# Patient Record
Sex: MALE | Race: BLACK OR AFRICAN AMERICAN | URBAN - METROPOLITAN AREA
[De-identification: names, ages, dates, MRNs, and addresses within clinical notes are randomized per-mention and may not be internally consistent; named-entity substitution may affect disease eponyms.]

---

## 2017-08-20 ENCOUNTER — HOSPITAL ENCOUNTER (EMERGENCY)
Facility: CLINIC | Age: 32
Discharge: HOME | End: 2017-08-20
Attending: EMERGENCY MEDICINE | Admitting: EMERGENCY MEDICINE

## 2017-08-20 ENCOUNTER — APPOINTMENT (OUTPATIENT)
Dept: GENERAL RADIOLOGY | Facility: CLINIC | Age: 32
End: 2017-08-20
Attending: EMERGENCY MEDICINE

## 2017-08-20 VITALS
OXYGEN SATURATION: 100 % | TEMPERATURE: 98.3 F | HEIGHT: 70 IN | RESPIRATION RATE: 16 BRPM | SYSTOLIC BLOOD PRESSURE: 147 MMHG | WEIGHT: 224.2 LBS | DIASTOLIC BLOOD PRESSURE: 85 MMHG | HEART RATE: 70 BPM | BODY MASS INDEX: 32.1 KG/M2

## 2017-08-20 DIAGNOSIS — S62.311A: ICD-10-CM
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
## 2017-08-20 DIAGNOSIS — S62.315A DISPLACED FRACTURE OF BASE OF FOURTH METACARPAL BONE, LEFT HAND, INITIAL ENCOUNTER FOR CLOSED FRACTURE: ICD-10-CM

## 2017-08-20 DIAGNOSIS — S62.313A DISPLACED FRACTURE OF BASE OF THIRD METACARPAL BONE, LEFT HAND, INITIAL ENCOUNTER FOR CLOSED FRACTURE: ICD-10-CM

## 2017-08-20 DIAGNOSIS — S62.92XA LEFT HAND FRACTURE, CLOSED, INITIAL ENCOUNTER: ICD-10-CM

## 2017-08-20 PROCEDURE — 29125 APPL SHORT ARM SPLINT STATIC: CPT | Performed by: EMERGENCY MEDICINE

## 2017-08-20 PROCEDURE — 29125 APPL SHORT ARM SPLINT STATIC: CPT | Mod: Z6 | Performed by: EMERGENCY MEDICINE

## 2017-08-20 PROCEDURE — 73110 X-RAY EXAM OF WRIST: CPT | Mod: LT

## 2017-08-20 PROCEDURE — 99284 EMERGENCY DEPT VISIT MOD MDM: CPT | Performed by: EMERGENCY MEDICINE

## 2017-08-20 PROCEDURE — 73130 X-RAY EXAM OF HAND: CPT | Mod: LT

## 2017-08-20 PROCEDURE — 99284 EMERGENCY DEPT VISIT MOD MDM: CPT | Mod: 25 | Performed by: EMERGENCY MEDICINE

## 2017-08-20 PROCEDURE — 25000132 ZZH RX MED GY IP 250 OP 250 PS 637: Performed by: EMERGENCY MEDICINE

## 2017-08-20 RX ORDER — HYDROCODONE BITARTRATE AND ACETAMINOPHEN 5; 325 MG/1; MG/1
1-2 TABLET ORAL EVERY 6 HOURS PRN
Qty: 30 TABLET | Refills: 0 | Status: SHIPPED | OUTPATIENT
Start: 2017-08-20

## 2017-08-20 RX ORDER — HYDROCODONE BITARTRATE AND ACETAMINOPHEN 5; 325 MG/1; MG/1
2 TABLET ORAL ONCE
Status: COMPLETED | OUTPATIENT
Start: 2017-08-20 | End: 2017-08-20

## 2017-08-20 RX ADMIN — HYDROCODONE BITARTRATE AND ACETAMINOPHEN 2 TABLET: 5; 325 TABLET ORAL at 10:32

## 2017-08-20 ASSESSMENT — ENCOUNTER SYMPTOMS
DIFFICULTY URINATING: 0
FEVER: 0
HEADACHES: 0
COUGH: 0
DYSURIA: 0
CONFUSION: 0
VOMITING: 0
ABDOMINAL PAIN: 0
COLOR CHANGE: 0
SHORTNESS OF BREATH: 0
EYE REDNESS: 0

## 2017-08-20 NOTE — ED AVS SNAPSHOT
[facility], Emergency Department    [address] [facility] [address]    Phone:  [phone]                                       Yanet Castañeda   MRN: [id number]    Department:  [facility], Emergency Department   Date of Visit:  8/20/2017           Patient Information     Date Of Birth          1985        Your diagnoses for this visit were:     Left hand fracture, closed, initial encounter        You were seen by Latisha Bonilla MD.        Discharge Instructions         Closed Hand Fracture (Adult)  You have a fracture, or broken bone, in your hand. This may be a small crack or chip in the bone. Or it may be a major break with the broken parts pushed out of place. A closed fracture means that the broken bone has not gone through the skin. A hand fracture is treated with a splint or cast. It usually takes 4 to 6 weeks to heal. Severe injuries may require surgery.    Home care    Keep your arm elevated to reduce pain and swelling. When sitting or lying down, elevate your arm above the level of your heart. You can do this by placing your arm on a pillow that rests on your chest or on a pillow at your side. This is most important during the first 48 hours after injury.    Apply an ice pack over the injured area for no more than 15 to 20 minutes. Do this every 1 to 2 hours for the first 24 to 48 hours. Continue with ice packs as needed to ease pain and swelling. To make an ice pack, put ice cubes in a plastic bag that seals at the top. Wrap the bag in a clean, thin towel or cloth. Never put ice or an ice pack directly on the skin. You can place the ice pack inside the sling and directly over the cast or splint. As the ice melts, be careful that the cast or splint doesn t get wet.    Keep the cast or splint completely dry at all times. Bathe with your cast or splint out of the water, protected with 2 large plastic bags. Place 1 bag outside the other. Tape each bag with duct tape at the top end.  If a fiberglass cast or splint gets wet, dry it with a hair dryer on a cool setting.    You may use over-the-counter pain medicine to control pain, unless another pain medicine was prescribed. If you have chronic liver or kidney disease or ever had a stomach ulcer or GI bleeding, talk with your provider beforeusing these medicines.  Follow-up care  Follow up with your healthcare provider within 1 week, or as advised. This is to be sure the bone is healing properly. If you were given a splint, it may be changed to a cast at your follow-up visit.  If X-rays were taken, you will be told of any new findings that may affect your care.  When to seek medical advice  Call your healthcare provider right away if any of these occur:    The plaster cast or splint becomes wet or soft    The fiberglass cast or splint stays wet for more than 24 hours    The cast has a bad smell    The plaster cast or splint becomes loose    There is increased tightness or pain under the cast or splint    The fingers on your injured hand become swollen, cold, blue, numb, or tingly  Date Last Reviewed: 12/3/2015    2790-1613 [employer]. [address] [place] [address]. All rights reserved. This information is not intended as a substitute for professional medical care. Always follow your healthcare professional's instructions.      Please make an appointment to follow up with Orthopedics (phone: [phone]) for tomorrow morning.  The hand surgeon is Dr. Risa Lindo. The Ortho clinic plans to call you for a time, however if you don't hear from them, call tomorrow morning by 8:30.     24 Hour Appointment Hotline       To make an appointment at any [facility], call [phone] ([phone]). If you don't have a family doctor or clinic, we will help you find one. [facility] are conveniently located to serve the needs of you and your family.          ED Discharge Orders     ROMERO Yuen  "of your medicines      START taking        Dose / Directions Last dose taken    HYDROcodone-acetaminophen 5-325 MG per tablet   Commonly known as:  NORCO   Dose:  1-2 tablet   Quantity:  30 tablet        Take 1-2 tablets by mouth every 6 hours as needed for pain   Refills:  0                Prescriptions were sent or printed at these locations (1 Prescription)                   Other Prescriptions                Printed at Department/Unit printer (1 of 1)         HYDROcodone-acetaminophen (NORCO) 5-325 MG per tablet                Procedures and tests performed during your visit     Wrist XR, G/E 3 views, left    XR Hand Left G/E 3 Views      Orders Needing Specimen Collection     None      Pending Results     No orders found from 8/18/2017 to 8/21/2017.            Pending Culture Results     No orders found from 8/18/2017 to 8/21/2017.            Pending Results Instructions     If you had any lab results that were not finalized at the time of your Discharge, you can call the ED Lab Result RN at [phone]. You will be contacted by this team for any positive Lab results or changes in treatment. The nurses are available 7 days a week from 10A to 6:30P.  You can leave a message 24 hours per day and they will return your call.        Thank you for choosing [facility]       Thank you for choosing [facility] for your care. Our goal is always to provide you with excellent care. Hearing back from our patients is one way we can continue to improve our services. Please take a few minutes to complete the written survey that you may receive in the mail after you visit with us. Thank you!        [employer] Information     [employer] lets you send messages to your doctor, view your test results, renew your prescriptions, schedule appointments and more. To sign up, go to [email or web address] . Click on \"Log in\" on the left side of the screen, which will take you to the Welcome page. Then click on \"Sign up Now\" on the right side of " the page.     You will be asked to enter the access code listed below, as well as some personal information. Please follow the directions to create your username and password.     Your access code is: [email or web address]  Expires: 2017 11:26 AM     Your access code will  in 90 days. If you need help or a new code, please call your [facility] clinic or [phone].        Care EveryWhere ID     This is your Care EveryWhere ID. This could be used by other organizations to access your [facility] medical records  [id number]        Equal Access to Services     [facility]: Haderen Foster, malia matamoros, kalyn chapman, grace velasco . So [facility] [phone].    ATENCIÓN: Si habla español, tiene a neal disposición servicios gratuitos de asistencia lingüística. Llame al [phone].    We comply with applicable federal civil rights laws and [place] laws. We do not discriminate on the basis of race, color, national origin, age, disability sex, sexual orientation or gender identity.            After Visit Summary       This is your record. Keep this with you and show to your community pharmacist(s) and doctor(s) at your next visit.

## 2017-08-20 NOTE — DISCHARGE INSTRUCTIONS
Closed Hand Fracture (Adult)  You have a fracture, or broken bone, in your hand. This may be a small crack or chip in the bone. Or it may be a major break with the broken parts pushed out of place. A closed fracture means that the broken bone has not gone through the skin. A hand fracture is treated with a splint or cast. It usually takes 4 to 6 weeks to heal. Severe injuries may require surgery.    Home care    Keep your arm elevated to reduce pain and swelling. When sitting or lying down, elevate your arm above the level of your heart. You can do this by placing your arm on a pillow that rests on your chest or on a pillow at your side. This is most important during the first 48 hours after injury.    Apply an ice pack over the injured area for no more than 15 to 20 minutes. Do this every 1 to 2 hours for the first 24 to 48 hours. Continue with ice packs as needed to ease pain and swelling. To make an ice pack, put ice cubes in a plastic bag that seals at the top. Wrap the bag in a clean, thin towel or cloth. Never put ice or an ice pack directly on the skin. You can place the ice pack inside the sling and directly over the cast or splint. As the ice melts, be careful that the cast or splint doesn t get wet.    Keep the cast or splint completely dry at all times. Bathe with your cast or splint out of the water, protected with 2 large plastic bags. Place 1 bag outside the other. Tape each bag with duct tape at the top end. If a fiberglass cast or splint gets wet, dry it with a hair dryer on a cool setting.    You may use over-the-counter pain medicine to control pain, unless another pain medicine was prescribed. If you have chronic liver or kidney disease or ever had a stomach ulcer or GI bleeding, talk with your provider beforeusing these medicines.  Follow-up care  Follow up with your healthcare provider within 1 week, or as advised. This is to be sure the bone is healing properly. If you were given a splint, it  may be changed to a cast at your follow-up visit.  If X-rays were taken, you will be told of any new findings that may affect your care.  When to seek medical advice  Call your healthcare provider right away if any of these occur:    The plaster cast or splint becomes wet or soft    The fiberglass cast or splint stays wet for more than 24 hours    The cast has a bad smell    The plaster cast or splint becomes loose    There is increased tightness or pain under the cast or splint    The fingers on your injured hand become swollen, cold, blue, numb, or tingly  Date Last Reviewed: 12/3/2015    7403-5249 [employer] [address] [place] [address]. All rights reserved. This information is not intended as a substitute for professional medical care. Always follow your healthcare professional's instructions.      Please make an appointment to follow up with Orthopedics (phone: [phone]) for tomorrow morning.  The hand surgeon is Dr. Risa Lindo. The Ortho clinic plans to call you for a time, however if you don't hear from them, call tomorrow morning by 8:30.

## 2017-08-20 NOTE — ED PROVIDER NOTES
"  History     Chief Complaint   Patient presents with     Hand Injury     HPI  Yanet Castañeda is a 32 year old male who presents to the Emergency Department today for a hand injury. The patient reports that he was playing football yesterday and fell on his left wrist. He reports that he has had increasing pain since then and that his pain today is much worse than it was last night. He also reports that it has become significantly swollen. He describes his current pain at an 8/10. He is right hand dominant. He denies hitting his head, loss of consciousness or neck or back injury. Denies numbness, weakness.     I have reviewed the Medications, Allergies, Past Medical and Surgical History, and Social History in the Epic system.  No past medical history on file.    No past surgical history on file.    No family history on file.    Social History   Substance Use Topics     Smoking status: Not on file     Smokeless tobacco: Not on file     Alcohol use Not on file       No current facility-administered medications for this encounter.      Current Outpatient Prescriptions   Medication     HYDROcodone-acetaminophen (NORCO) 5-325 MG per tablet      No Known Allergies    Review of Systems   Constitutional: Negative for fever.   HENT: Negative for congestion.    Eyes: Negative for redness.   Respiratory: Negative for cough and shortness of breath.    Cardiovascular: Negative for chest pain.   Gastrointestinal: Negative for abdominal pain and vomiting.   Genitourinary: Negative for difficulty urinating and dysuria.   Musculoskeletal:        See HPI   Skin: Negative for color change.   Neurological: Negative for headaches.   Psychiatric/Behavioral: Negative for confusion.       Physical Exam   BP: 147/85  Pulse: 72  Temp: 98.3  F (36.8  C)  Resp: 16  Height: 177.8 cm (5' 10\")  Weight: 101.7 kg (224 lb 3.2 oz)  SpO2: 100 %  Physical Exam    GEN:  Alert, well developed, no acute distress  HEENT:  PERRL, EOMI, Mucous membranes are " moist.   Cardio:  RRR, no murmur, radial pulses equal bilaterally  PULM:  Lungs clear, good air movement, no wheezes, rales   Abd:  Soft, normal bowel sounds, no focal tenderness  Back exam:  no C-spine, T-spine or L-spine tenderness   Musculoskeletal: The left hand is swollen with ecchymoses along the thenar eminence and 1st dorsal web space. There is tenderness over the scaphoid and the 3rd metatarsal . No tenderness over the distal radium or ulna. Wrist range of motion and hand range of motion is decreased due to pain and swelling.   Neuro:  Alert and oriented X3, Follows commands, normal sensation throughout the left hand.  Strength in the fingers and thumb is normal, testing of strength at the wrist and hand is limited by pain and swelling  Skin:  Warm, dry   ED Course   9:40 AM  The patient was seen and examined by Dr. Bonilla in Room 04.     ED Course     Procedures           Critical Care time:  none   X-rays of the wrist and hand were reviewed by me and results are shown here:        Wrist XR, G/E 3 views, left (Preliminary result) Result time: 08/20/17 10:41:05     Preliminary result by Art Estrada MD (08/20/17 10:41:05)     Narrative:       1. Fracture through the base of the second and fourth metacarpals.  2. Negative ulnar variance may represent luminous injury.               XR Hand Left G/E 3 Views (Preliminary result) Result time: 08/20/17 10:39:34     Preliminary result by Art Estrada MD (08/20/17 10:39:34)     Narrative:     Fracture through the base of the second through fourth metacarpals.        He was offered pain medicine and he in initially declined, but changed his mind and was given Vicodin. This did help with the pain.  Paint was discussed with Ortho who advised intrinsic plus splint and follow up in Ortho clinic tomorrow AM with hand surgeon Dr. Lindo.     I placed intrinsic plus splint with plenty of cast padding and Orthoglass.  Wrist was splinted in neutral position to slight  extension with flexion at the MCPs and extension at the PIP joints.  Patient was cooperative and tolerated splint placement well. Sling was placed as well to avoid further swelling in the hand.     Labs Ordered and Resulted from Time of ED Arrival Up to the Time of Departure from the ED - No data to display         Assessments & Plan (with Medical Decision Making)   Left hand fracture of metacarpals 2, 3 and 4 with some displacement at the base of the 2nd metacarpal.  Pain is well controlled and there are no neurovascular deficits.  He will follow up with Ortho in clinic tomorrow. Surgical repair may be needed and this was explained to the patient. He was advised to return if any pain, tingling, color change to fingers, numbness or other concerns.     I have reviewed the nursing notes.     I have reviewed the findings, diagnosis, plan and need for follow up with the patient.    New Prescriptions    HYDROCODONE-ACETAMINOPHEN (NORCO) 5-325 MG PER TABLET    Take 1-2 tablets by mouth every 6 hours as needed for pain       Final diagnoses:   Left hand fracture, closed, initial encounter   I, Billy Kessler, am serving as a trained medical scribe to document services personally performed by Maricrzu Bonilla MD, based on the provider's statements to me.   IMaricruz MD, was physically present and have reviewed and verified the accuracy of this note documented by Billy Kessler.      8/20/2017   [facility], [place], EMERGENCY DEPARTMENT     Latisha Bonilla MD  08/20/17 1054

## 2017-08-20 NOTE — ED NOTES
Pt presents with complaints of left hand pain. Has not had any medical treatment previously. Pt states he believes it may be broken/fractured/sprained.

## 2017-08-20 NOTE — ED AVS SNAPSHOT
[facility] [place], Emergency Department    [address] [place] [address]    Phone:  [phone]                                       Yanet Castañeda   MRN: [id number]    Department:  [facility], Emergency Department   Date of Visit:  8/20/2017           After Visit Summary Signature Page     I have received my discharge instructions, and my questions have been answered. I have discussed any challenges I see with this plan with the nurse or doctor.    ..........................................................................................................................................  Patient/Patient Representative Signature      ..........................................................................................................................................  Patient Representative Print Name and Relationship to Patient    ..................................................               ................................................  Date                                            Time    ..........................................................................................................................................  Reviewed by Signature/Title    ...................................................              ..............................................  Date                                                            Time

## 2017-08-21 ENCOUNTER — TELEPHONE (OUTPATIENT)
Dept: ORTHOPEDICS | Facility: CLINIC | Age: 32
End: 2017-08-21

## 2017-08-21 ENCOUNTER — PRE VISIT (OUTPATIENT)
Dept: ORTHOPEDICS | Facility: CLINIC | Age: 32
End: 2017-08-21

## 2017-08-21 NOTE — TELEPHONE ENCOUNTER
Appointment scheduled with Dr Lindo 08/22/17 to discuss treatment of left metacarpal fractures.  Message left on home voicemail to call the clinic to confirm he can come to the appointment.

## 2017-08-21 NOTE — TELEPHONE ENCOUNTER
1.  Date/reason for appt: 8/22/17 - Multiple metacarpal base fractures.  2.  Referring provider: [facility]  3.  Call to patient (Yes / No - short description): no, recs/img in epic  4.  Previous care at:    [facility] on 8/20/17 (xrays in epic/pacs)

## 2017-08-22 ENCOUNTER — OFFICE VISIT (OUTPATIENT)
Dept: ORTHOPEDICS | Facility: CLINIC | Age: 32
End: 2017-08-22

## 2017-08-22 VITALS — WEIGHT: 224 LBS | HEIGHT: 70 IN | BODY MASS INDEX: 32.07 KG/M2

## 2017-08-22 DIAGNOSIS — S62.345A CLOSED NONDISPLACED FRACTURE OF BASE OF FOURTH METACARPAL BONE OF LEFT HAND, INITIAL ENCOUNTER: ICD-10-CM

## 2017-08-22 DIAGNOSIS — S62.347A CLOSED NONDISPLACED FRACTURE OF BASE OF FIFTH METACARPAL BONE OF LEFT HAND, INITIAL ENCOUNTER: ICD-10-CM

## 2017-08-22 DIAGNOSIS — S62.343A CLOSED NONDISPLACED FRACTURE OF BASE OF THIRD METACARPAL BONE OF LEFT HAND, INITIAL ENCOUNTER: ICD-10-CM

## 2017-08-22 DIAGNOSIS — S62.311A CLOSED DISPLACED FRACTURE OF BASE OF SECOND METACARPAL BONE OF LEFT HAND, INITIAL ENCOUNTER: Primary | ICD-10-CM

## 2017-08-22 ASSESSMENT — ENCOUNTER SYMPTOMS
CONSTIPATION: 0
MUSCLE CRAMPS: 0
WEIGHT LOSS: 0
SMELL DISTURBANCE: 0
CHILLS: 1
JOINT SWELLING: 0
HOARSE VOICE: 0
ALTERED TEMPERATURE REGULATION: 1
EYE WATERING: 0
SINUS CONGESTION: 0
BACK PAIN: 0
STIFFNESS: 0
RECTAL PAIN: 0
ABDOMINAL PAIN: 0
FLANK PAIN: 0
FATIGUE: 0
TASTE DISTURBANCE: 0
NIGHT SWEATS: 1
BLOOD IN STOOL: 0
TROUBLE SWALLOWING: 0
NECK MASS: 0
DECREASED APPETITE: 1
HEMATURIA: 0
INCREASED ENERGY: 0
MYALGIAS: 0
MUSCLE WEAKNESS: 0
DIARRHEA: 0
SORE THROAT: 0
BLOATING: 0
EYE IRRITATION: 0
DIFFICULTY URINATING: 0
DOUBLE VISION: 0
HEARTBURN: 0
ARTHRALGIAS: 0
JAUNDICE: 0
BOWEL INCONTINENCE: 0
DYSURIA: 0
NECK PAIN: 0
POLYDIPSIA: 1
RECTAL BLEEDING: 0
EYE REDNESS: 0
NAUSEA: 0
SINUS PAIN: 0
EYE PAIN: 0
VOMITING: 0
WEIGHT GAIN: 0
POLYPHAGIA: 0
FEVER: 0

## 2017-08-22 NOTE — MR AVS SNAPSHOT
After Visit Summary   8/22/2017    Yanet Castañeda    MRN: [id number]           Patient Information     Date Of Birth          1985        Visit Information        Provider Department      8/22/2017 12:00 PM Risa Lindo MD [facility]        Today's Diagnoses     Closed displaced fracture of base of second metacarpal bone of left hand, initial encounter    -  1    Closed nondisplaced fracture of base of third metacarpal bone of left hand, initial encounter        Closed nondisplaced fracture of base of fourth metacarpal bone of left hand, initial encounter        Closed nondisplaced fracture of base of fifth metacarpal bone of left hand, initial encounter           Follow-ups after your visit        Your next 10 appointments already scheduled     Sep 12, 2017 12:10 PM CDT   XR HAND LEFT G/E 3 VIEWS with [id number]   [facility] ([facility])    [address] [place] [address] [phone]           Please bring a list of your current medicines to your exam. (Include vitamins, minerals and over-thecounter medicines.) Leave your valuables at home.  Tell your doctor if there is a chance you may be pregnant.  You do not need to do anything special for this exam.            Sep 12, 2017 12:30 PM CDT   (Arrive by 12:15 PM)   RETURN HAND with Risa Lindo MD   [facility] ([facility])    [address] [place] [address] [phone]            Sep 12, 2017  2:00 PM CDT   (Arrive by 1:45 PM)   TANNER Hand with EMMANUEL Garcia   [facility] ([facility])    [address] [place] [address] [phone]              Who to contact     Please call your clinic at [phone] to:    Ask questions about your health    Make or cancel appointments    Discuss your medicines    Learn about  "your test results    Speak to your doctor   If you have compliments or concerns about an experience at your clinic, or if you wish to file a complaint, please contact [facility] Patient Relations at [phone] or email us at [email or web address]         Additional Information About Your Visit        [employer] Information     [employer] is an electronic gateway that provides easy, online access to your medical records. With [employer], you can request a clinic appointment, read your test results, renew a prescription or communicate with your care team.     To sign up for [employer] visit the website at [email or web address]   You will be asked to enter the access code listed below, as well as some personal information. Please follow the directions to create your username and password.     Your access code is: [email or web address]  Expires: 2017 11:26 AM     Your access code will  in 90 days. If you need help or a new code, please contact your [facility] or call [phone] for assistance.        Care EveryWhere ID     This is your Care EveryWhere ID. This could be used by other organizations to access your [facility] medical records  [id number]        Your Vitals Were     Height BMI (Body Mass Index)                1.778 m (5' 10\") 32.14 kg/m2           Blood Pressure from Last 3 Encounters:   17 147/85    Weight from Last 3 Encounters:   17 101.6 kg (224 lb)   17 101.6 kg (224 lb)   17 101.7 kg (224 lb 3.2 oz)               Primary Care Provider    Physician No Ref-Primary       No address on file        Equal Access to Services     RAJAN BROCK AH: Hadii jose godoy Sojohn, waaxda luqadaha, qaybta kaalmada ari, grace aguilar. So [facility] [phone].    ATENCIÓN: Si habla español, tiene a neal disposición servicios gratuitos de asistencia lingüística. Llame al [phone].    We comply " with applicable federal civil rights laws and [place] laws. We do not discriminate on the basis of race, color, national origin, age, disability sex, sexual orientation or gender identity.            Thank you!     Thank you for choosing [facility]  for your care. Our goal is always to provide you with excellent care. Hearing back from our patients is one way we can continue to improve our services. Please take a few minutes to complete the written survey that you may receive in the mail after your visit with us. Thank you!             Your Updated Medication List - Protect others around you: Learn how to safely use, store and throw away your medicines at www.disposemymeds.org.          This list is accurate as of: 8/22/17 11:59 PM.  Always use your most recent med list.                   Brand Name Dispense Instructions for use Diagnosis    HYDROcodone-acetaminophen 5-325 MG per tablet    NORCO    30 tablet    Take 1-2 tablets by mouth every 6 hours as needed for pain

## 2017-08-22 NOTE — Clinical Note
8/22/2017       RE: Yanet Castañeda  [address] [facility] [address]     Dear Colleague,    Thank you for referring your patient, Yanet Castañeda, to the [facility] at [facility] Please see a copy of my visit note below.    No notes on file    Again, thank you for allowing me to participate in the care of your patient.      Sincerely,    Risa Lindo MD

## 2017-08-22 NOTE — NURSING NOTE
"Reason For Visit:   Chief Complaint   Patient presents with     Consult     Multiple metacarpal base fractures. DOI 8/19/17 from playing football.       Primary MD: No Ref-Primary, Physician  Ref. MD: ED     Age: 32 year old    ?  No      Ht 1.778 m (5' 10\")  Wt 101.6 kg (224 lb)  BMI 32.14 kg/m2      Pain Assessment  Patient Currently in Pain: Yes  0-10 Pain Scale: 7  Primary Pain Location: Wrist  Pain Orientation: Left  Pain Descriptors: Discomfort, Sore    Hand Dominance Evaluation  Hand Dominance: Right          QuickDASH Assessment  QuickDASH Main 8/22/2017   1.Open a tight or new jar. Unable   2. Do heavy household chores (e.g., wash walls, floors) Unable   3. Carry a shopping bag or briefcase. No difficulty   4. Wash your back. Severe difficulty   5. Use a knife to cut food. Unable   6. Recreational activities in which you take some force or impact through your arm, shoulder or hand (e.g., golf, hammering, tennis, etc.). Unable   7. During the past week, to what extent has your arm, shoulder or hand problem interfered with your normal social activities with family, friends, neighbours or groups? Unable to answer   8. During the past week, were you limited in your work or other regular daily activities as a result of your arm, shoulder or hand problem? Unable to answer   9. Arm, shoulder or hand pain. Extreme   10.Tingling (pins and needles) in your arm,shoulder or hand. Moderate   11. During the past week, how much difficulty have you had sleeping because of the pain in your arm, shoulder or hand? ([place] number) Moderate difficulty   Quickdash Ability Score 56.81          Current Outpatient Prescriptions   Medication Sig Dispense Refill     HYDROcodone-acetaminophen (NORCO) 5-325 MG per tablet Take 1-2 tablets by mouth every 6 hours as needed for pain 30 tablet 0       No Known Allergies    Dinorah Nguyen LPN  "

## 2017-08-22 NOTE — PROGRESS NOTES
HAND SURGERY CLINIC NOTE      CHIEF COMPLAINT: left-hand pain      HISTORY OF PRESENT ILLNESS:   Yanet Castañeda is a 32 year old old right hand dominant male seen in clinic today for evaluation of right metacarpal fractures.  He was playing football on 8/19 when he fell landing on a flexed left wrist. He had immediate pain and swelling in the hand. He was evaluated at the [place] emergency department. He was noted to have multiple metacarpal base fractures, and was placed into a splint. He is here today for evaluation and discussion of treatment. No previous injuries or surgeries to that hand. He denies numbness or tingling. He denies any other injuries to that left upper extremity or elsewhere.  No head trauma or loss of consciousness.    PAST MEDICAL HISTORY:  There is no problem list on file for this patient.    PAST SURGICAL HISTORY:  No past surgical history on file.    ALLERGIES: No known drug allergies.    SOCIAL HISTORY:   Occupation:  information technology.  The patient smokes hookah 1 time per week.  The patient uses occasional alcohol.      FAMILY HISTORY: Reviewed and non-contributory.     REVIEW OF SYSTEMS:  No fever, chills, nausea/vomiting, chest pain trouble breathing, unintentional weight loss, change in bowel or bladder function, rashes.    PHYSICAL EXAMINATION:   Body mass index is 32.14 kg/(m^2).    QUICKDASH: 56.81  Pain is 7 out of 10 on visual analog scale.  He is a pleasant male, alert and oriented and in no acute distress.  He is well dressed and well groomed with appropriate affect.    Head is normocephalic and atraumatic.  Extraocular movements are intact.  Neck with full range of motion without pain.  PULMONARY: Breathing comfortably on room air.  SKIN: Exam of the skin shows normal color and texture.  No swelling, or bruising is noted.  VASCULAR: 2+ radial pulse palpated bilaterally and brisk capillary refill.  MUSCULOSKELETAL:    left hand: significant swelling of the left hand. Skin  intact. Tender along the index through small finger CMC joints. No tenderness over the anatomic snuffbox. No tenderness over the metacarpophalangeal joints. He has range of motion through the MP joints. Sensation intact radial, median, ulnar nerve. Fingers are warm and well-perfused. Radial pulses intact. Fires FPL, EPL, intrinsics.  LYMPH: No edema  NEURO: Sensation is intact to light touch in the median, ulnar and radial nerve distributions.      RESULTS REVIEW:  Review of left hand x-rays show fracture of the base of the 2nd metacarpal. There is a nondisplaced intra-articular component. Shaft is volar translated. The CMC joint is congruent. Nondisplaced transverse fracture of the base of the long finger metacarpal. Intra-articular fracture nondisplaced of the ring finger metacarpal. Small avulsion fracture off the ulnar base of the small finger metacarpal. 5th CMC joint is well-reduced.    Under live fluoroscopy the hand was imaged which showed reduced 2nd through 5th CMC joints of the lefthand. There was a intra-articular extension of the index finger metacarpal which was minimally displaced.    ASSESSMENT:   - 32, male, right-hand dominant, healthy with left hand multiple metacarpal base fractures. The index finger metacarpal is displaced however the joint is congruent. Treatment options were discussed, including operative and nonoperative treatment. We discussed that given the overall congruity of his joints and limited motion of the index CMC joint that surgery was not absolutely necessary.  We discussed obtaining a CT scan of his hand to further evaluate, but he declined and is not interested in surgical intervention.  He understands possible risks that include deformity on the dorsal aspect of the hand.  Again, the fractures are primarily extra-articular except for the index metacarpal, and motion is often limited at that joint.     PLAN:   - Left-hand placed in splint.  - Follow-up one week to transition  to cast.  - Likely 4 weeks of immobilization, followed by transition to splint with initiation of gentle active range of motion.    The patient was seen today by myself as well as Dr. Lindo she agrees that the plan above.    I have independently seen and evaluated the patient and agree with the findings and plan of care as documented by the resident and edited by me.      Answers for HPI/ROS submitted by the patient on 8/22/2017   General Symptoms: Yes  Skin Symptoms: No  HENT Symptoms: Yes  EYE SYMPTOMS: Yes  HEART SYMPTOMS: No  LUNG SYMPTOMS: No  INTESTINAL SYMPTOMS: Yes  URINARY SYMPTOMS: Yes  REPRODUCTIVE SYMPTOMS: No  SKELETAL SYMPTOMS: Yes  BLOOD SYMPTOMS: No  NERVOUS SYSTEM SYMPTOMS: No  MENTAL HEALTH SYMPTOMS: No  Fever: No  Loss of appetite: Yes  Weight loss: No  Weight gain: No  Fatigue: No  Night sweats: Yes  Chills: Yes  Increased stress: No  Excessive hunger: No  Excessive thirst: Yes  Feeling hot or cold when others believe the temperature is normal: Yes  Loss of height: No  Post-operative complications: No  Surgical site pain: No  Change in or Loss of Energy: No  Hyperactivity: No  Confusion: No  Ear pain: No  Ear discharge: No  Hearing loss: No  Tinnitus: No  Nosebleeds: No  Congestion: No  Sinus pain: No  Trouble swallowing: No   Voice hoarseness: No  Mouth sores: No  Sore throat: No  Tooth pain: No  Gum tenderness: No  Bleeding gums: No  Change in taste: No  Change in sense of smell: No  Dry mouth: No  Hearing aid used: No  Neck lump: No  Eye pain: No  Vision loss: No  Dry eyes: No  Watery eyes: No  Eye bulging: No  Double vision: No  Flashing of lights: No  Spots: No  Floaters: No  Redness: No  Crossed eyes: No  Tunnel Vision: No  Yellowing of eyes: No  Eye irritation: No  Heart burn or indigestion: No  Nausea: No  Vomiting: No  Abdominal pain: No  Bloating: No  Constipation: No  Diarrhea: No  Blood in stool: No  Black stools: No  Rectal or Anal pain: No  Fecal incontinence: No  Rectal  bleeding: No  Yellowing of skin or eyes: No  Vomit with blood: No  Change in stools: No  Hemorrhoids: No  Trouble holding urine or incontinence: No  Pain or burning: No  Trouble starting or stopping: No  Increased frequency of urination: No  Blood in urine: No  Decreased frequency of urination: No  Frequent nighttime urination: No  Flank pain: No  Difficulty emptying bladder: No  Back pain: No  Muscle aches: No  Neck pain: No  Swollen joints: No  Joint pain: No  Bone pain: No  Muscle cramps: No  Muscle weakness: No  Joint stiffness: No  Bone fracture: No

## 2017-08-22 NOTE — NURSING NOTE
Patient has multiple left hand metacarpal base fractures.  Placed in a well padded dorsal extension blocking splint with PIP joints and thumb free. The left hand is too swollen today for a cast.   Patient instructed to keep the splint clean and dry, elevate the hand above the level of the heart as much as possible, apply ice 3-4 times per day.  Plan to follow up in 1 week to assess the swelling and potentially place in a cast.

## 2017-08-29 ENCOUNTER — OFFICE VISIT (OUTPATIENT)
Dept: ORTHOPEDICS | Facility: CLINIC | Age: 32
End: 2017-08-29

## 2017-08-29 VITALS — BODY MASS INDEX: 32.07 KG/M2 | WEIGHT: 224 LBS | HEIGHT: 70 IN

## 2017-08-29 DIAGNOSIS — S62.309A FRACTURE OF METACARPAL: Primary | ICD-10-CM

## 2017-08-29 DIAGNOSIS — S62.92XA FRACTURE OF LEFT HAND: Primary | ICD-10-CM

## 2017-08-29 NOTE — NURSING NOTE
Patient was placed into a well-fitting, short arm fiberglass cast on the left hand, with waterproof padding. Patient was educated in cast care, and verbalized understanding.

## 2017-08-29 NOTE — MR AVS SNAPSHOT
After Visit Summary   8/29/2017    Yanet Castañeda    MRN: [id number]           Patient Information     Date Of Birth          1985        Visit Information        Provider Department      8/29/2017 12:45 PM Risa Lindo MD [facility]         Follow-ups after your visit        Your next 10 appointments already scheduled     Sep 12, 2017 12:30 PM CDT   (Arrive by 12:15 PM)   RETURN HAND with Risa Lindo MD   [facility] ([facility])    [address] [place] [id number] [phone]            Sep 12, 2017  2:00 PM CDT   (Arrive by 1:45 PM)   TANNER Hand with EMMANUEL Garcia   [facility] ([facility])    [address] [place] [id number] [phone]              Who to contact     Please call your clinic at [phone] to:    Ask questions about your health    Make or cancel appointments    Discuss your medicines    Learn about your test results    Speak to your doctor   If you have compliments or concerns about an experience at your clinic, or if you wish to file a complaint, please contact [facility] Patient Relations at [phone] or email us at [email or web address]         Additional Information About Your Visit        MyChart Information     PANOSOLt is an electronic gateway that provides easy, online access to your medical records. With [employer], you can request a clinic appointment, read your test results, renew a prescription or communicate with your care team.     To sign up for PANOSOLt visit the website at [email or web address]   You will be asked to enter the access code listed below, as well as some personal information. Please follow the directions to create your username and password.     Your access code is: [email or web address]  Expires: 11/18/2017 11:26 AM     Your access code will  " in 90 days. If you need help or a new code, please contact your [facility] or call [phone] for assistance.        Care EveryWhere ID     This is your Care EveryWhere ID. This could be used by other organizations to access your [place] medical records  [id number]        Your Vitals Were     Height BMI (Body Mass Index)                1.778 m (5' 10\") 32.14 kg/m2           Blood Pressure from Last 3 Encounters:   17 147/85    Weight from Last 3 Encounters:   17 101.6 kg (224 lb)   17 101.6 kg (224 lb)   17 101.7 kg (224 lb 3.2 oz)              Today, you had the following     No orders found for display       Primary Care Provider    Physician No Ref-Primary       No address on file        Equal Access to Services     RAJAN BROCK : Hadii jose godoy Sojohn, waaxda luqadaha, qaybta kaalmada ari, grace velasco . So [facility] [phone].    ATENCIÓN: Si habla español, tiene a neal disposición servicios gratuitos de asistencia lingüística. Llame al [phone].    We comply with applicable federal civil rights laws and [place] laws. We do not discriminate on the basis of race, color, national origin, age, disability sex, sexual orientation or gender identity.            Thank you!     Thank you for choosing [facility]  for your care. Our goal is always to provide you with excellent care. Hearing back from our patients is one way we can continue to improve our services. Please take a few minutes to complete the written survey that you may receive in the mail after your visit with us. Thank you!             Your Updated Medication List - Protect others around you: Learn how to safely use, store and throw away your medicines at www.disposemymeds.org.          This list is accurate as of: 17  2:04 PM.  Always use your most recent med list.                   Brand Name Dispense Instructions for use " Diagnosis    HYDROcodone-acetaminophen 5-325 MG per tablet    NORCO    30 tablet    Take 1-2 tablets by mouth every 6 hours as needed for pain

## 2017-08-29 NOTE — NURSING NOTE
"Reason For Visit:   Chief Complaint   Patient presents with     RECHECK     F/U left metacarpals Fracture.  DOI 8/19/17        Primary MD: No Ref-Primary, Physician  Ref. MD: self    Age: 32 year old    ?  No      Ht 1.778 m (5' 10\")  Wt 101.6 kg (224 lb)  BMI 32.14 kg/m2      Pain Assessment  Patient Currently in Pain: No (Pain comes by hand position. )    Hand Dominance Evaluation  Hand Dominance: Right          QuickDASH Assessment  QuickDASH Main 8/29/2017   1.Open a tight or new jar. Unable   2. Do heavy household chores (e.g., wash walls, floors) Unable   3. Carry a shopping bag or briefcase. Moderate difficulty   4. Wash your back. Moderate difficulty   5. Use a knife to cut food. Unable   6. Recreational activities in which you take some force or impact through your arm, shoulder or hand (e.g., golf, hammering, tennis, etc.). Unable   7. During the past week, to what extent has your arm, shoulder or hand problem interfered with your normal social activities with family, friends, neighbours or groups? Extremely   8. During the past week, were you limited in your work or other regular daily activities as a result of your arm, shoulder or hand problem? Moderately limited   9. Arm, shoulder or hand pain. Moderate   10.Tingling (pins and needles) in your arm,shoulder or hand. Extreme   11. During the past week, how much difficulty have you had sleeping because of the pain in your arm, shoulder or hand? ([place] number) Severe difficulty   Quickdash Ability Score 79.54          Current Outpatient Prescriptions   Medication Sig Dispense Refill     HYDROcodone-acetaminophen (NORCO) 5-325 MG per tablet Take 1-2 tablets by mouth every 6 hours as needed for pain 30 tablet 0       No Known Allergies    Dinorah Nguyen LPN    "

## 2017-09-07 DIAGNOSIS — S62.92XA FRACTURE OF LEFT HAND: Primary | ICD-10-CM

## 2017-09-12 ENCOUNTER — THERAPY VISIT (OUTPATIENT)
Dept: OCCUPATIONAL THERAPY | Facility: CLINIC | Age: 32
End: 2017-09-12

## 2017-09-12 ENCOUNTER — OFFICE VISIT (OUTPATIENT)
Dept: ORTHOPEDICS | Facility: CLINIC | Age: 32
End: 2017-09-12

## 2017-09-12 VITALS — BODY MASS INDEX: 32.07 KG/M2 | WEIGHT: 224 LBS | HEIGHT: 70 IN

## 2017-09-12 DIAGNOSIS — S62.345D CLOSED NONDISPLACED FRACTURE OF BASE OF FOURTH METACARPAL BONE OF LEFT HAND WITH ROUTINE HEALING, SUBSEQUENT ENCOUNTER: ICD-10-CM

## 2017-09-12 DIAGNOSIS — M25.642 STIFFNESS OF LEFT HAND JOINT: ICD-10-CM

## 2017-09-12 DIAGNOSIS — S62.343D CLOSED NONDISPLACED FRACTURE OF BASE OF THIRD METACARPAL BONE OF LEFT HAND WITH ROUTINE HEALING, SUBSEQUENT ENCOUNTER: ICD-10-CM

## 2017-09-12 DIAGNOSIS — S62.347D CLOSED NONDISPLACED FRACTURE OF BASE OF FIFTH METACARPAL BONE OF LEFT HAND WITH ROUTINE HEALING, SUBSEQUENT ENCOUNTER: ICD-10-CM

## 2017-09-12 DIAGNOSIS — S62.311D CLOSED DISPLACED FRACTURE OF BASE OF SECOND METACARPAL BONE OF LEFT HAND WITH ROUTINE HEALING, SUBSEQUENT ENCOUNTER: Primary | ICD-10-CM

## 2017-09-12 DIAGNOSIS — S62.309A CLOSED FRACTURE OF METACARPAL BONE: Primary | ICD-10-CM

## 2017-09-12 DIAGNOSIS — M79.89 SWELLING OF LEFT HAND: ICD-10-CM

## 2017-09-12 PROCEDURE — 97165 OT EVAL LOW COMPLEX 30 MIN: CPT | Mod: GO | Performed by: OCCUPATIONAL THERAPIST

## 2017-09-12 PROCEDURE — 29125 APPL SHORT ARM SPLINT STATIC: CPT | Mod: GO | Performed by: OCCUPATIONAL THERAPIST

## 2017-09-12 NOTE — NURSING NOTE
"Reason For Visit:   Chief Complaint   Patient presents with     RECHECK     F/U left metacarpals Fracture.  DOI 8/19/17        Primary MD: No Ref-Primary, Physician  Ref. MD: self    Age: 32 year old    ?  No      Ht 1.778 m (5' 10\")  Wt 101.6 kg (224 lb)  BMI 32.14 kg/m2      Pain Assessment  Patient Currently in Pain: No    Hand Dominance Evaluation  Hand Dominance: Right          QuickDASH Assessment  QuickDASH Main 9/12/2017   1.Open a tight or new jar. Severe difficulty   2. Do heavy household chores (e.g., wash walls, floors) Unable   3. Carry a shopping bag or briefcase. Moderate difficulty   4. Wash your back. Severe difficulty   5. Use a knife to cut food. Severe difficulty   6. Recreational activities in which you take some force or impact through your arm, shoulder or hand (e.g., golf, hammering, tennis, etc.). Unable   7. During the past week, to what extent has your arm, shoulder or hand problem interfered with your normal social activities with family, friends, neighbours or groups? Quite a bit   8. During the past week, were you limited in your work or other regular daily activities as a result of your arm, shoulder or hand problem? Unable   9. Arm, shoulder or hand pain. Moderate   10.Tingling (pins and needles) in your arm,shoulder or hand. Moderate   11. During the past week, how much difficulty have you had sleeping because of the pain in your arm, shoulder or hand? ([place] number) Moderate difficulty   Quickdash Ability Score 72.72          Current Outpatient Prescriptions   Medication Sig Dispense Refill     HYDROcodone-acetaminophen (NORCO) 5-325 MG per tablet Take 1-2 tablets by mouth every 6 hours as needed for pain (Patient not taking: Reported on 9/12/2017) 30 tablet 0       No Known Allergies    Dinorah Nguyen LPN  "

## 2017-09-12 NOTE — MR AVS SNAPSHOT
After Visit Summary   9/12/2017    Yanet Castañeda    MRN: [id number]           Patient Information     Date Of Birth          1985        Visit Information        Provider Department      9/12/2017 12:30 PM Risa Lindo MD [facility]        Today's Diagnoses     Closed displaced fracture of base of second metacarpal bone of left hand with routine healing, subsequent encounter    -  1    Closed nondisplaced fracture of base of third metacarpal bone of left hand with routine healing, subsequent encounter        Closed nondisplaced fracture of base of fourth metacarpal bone of left hand with routine healing, subsequent encounter        Closed nondisplaced fracture of base of fifth metacarpal bone of left hand with routine healing, subsequent encounter           Follow-ups after your visit        Additional Services     HAND THERAPY       Hand Therapy Referral                  Who to contact     Please call your clinic at [phone] to:    Ask questions about your health    Make or cancel appointments    Discuss your medicines    Learn about your test results    Speak to your doctor   If you have compliments or concerns about an experience at your clinic, or if you wish to file a complaint, please contact [facility] Patient Relations at [phone] or email us at [email or web address]         Additional Information About Your Visit        MyChart Information     27 bardst is an electronic gateway that provides easy, online access to your medical records. With [employer], you can request a clinic appointment, read your test results, renew a prescription or communicate with your care team.     To sign up for 27 bardst visit the website at [email or web address]   You will be asked to enter the access code listed below, as well as some personal information. Please follow the directions to create your username and password.     Your access code  "is: [email or web address]  Expires: 3/7/2018 12:45 PM     Your access code will  in 90 days. If you need help or a new code, please contact your [facility] or call [phone] for assistance.        Care EveryWhere ID     This is your Care EveryWhere ID. This could be used by other organizations to access your [place] medical records  [id number]        Your Vitals Were     Height BMI (Body Mass Index)                1.778 m (5' 10\") 32.14 kg/m2           Blood Pressure from Last 3 Encounters:   17 147/85    Weight from Last 3 Encounters:   17 101.6 kg (224 lb)   17 101.6 kg (224 lb)   17 101.6 kg (224 lb)              We Performed the Following     HAND THERAPY        Primary Care Provider Fax #    Physician No Ref-Primary [phone]       No address on file        Equal Access to Services     RAJAN BROCK : Hadii jose stevenso Kristin, waaxda luqadaha, qaybta kaalmada adeegyada, grace velasco . So [facility] [phone].    ATENCIÓN: Si habla español, tiene a neal disposición servicios gratuitos de asistencia lingüística. Llame al [phone].    We comply with applicable federal civil rights laws and [place] laws. We do not discriminate on the basis of race, color, national origin, age, disability, sex, sexual orientation, or gender identity.            Thank you!     Thank you for choosing [facility]  for your care. Our goal is always to provide you with excellent care. Hearing back from our patients is one way we can continue to improve our services. Please take a few minutes to complete the written survey that you may receive in the mail after your visit with us. Thank you!             Your Updated Medication List - Protect others around you: Learn how to safely use, store and throw away your medicines at www.disposemymeds.org.          This list is accurate as of: 17 11:59 PM.  Always use your most recent " med list.                   Brand Name Dispense Instructions for use Diagnosis    HYDROcodone-acetaminophen 5-325 MG per tablet    NORCO    30 tablet    Take 1-2 tablets by mouth every 6 hours as needed for pain

## 2017-09-12 NOTE — PROGRESS NOTES
Hand Therapy Initial Evaluation    Current Date:  9/12/2017      Diagnosis:  Left  Metacarpal fx digits 2-5   DOI: 8/19/2017  Post:  3w   Referring MD: Risa Lindo MD    Next MD visit: 10/3/17  MD order:  AROM Cmt: Gentel active range of motion  Forearm based forsal blocking splint at  MP, IP free    Subjective:  Yanet Castañeda is a 32 year old R hand dominant male.    Patient reports symptoms of stiffness/loss of motion, weakness/loss of strength, edema and tingling  of the left hand which occurred due to playing football. Since onset symptoms are Gradually getting better.  Special tests:  x-ray.  Previous treatment: casted for 3 weeks.    General health as reported by patient is good.  Pertinent medical history includes:none  Medical allergies:none.  Surgical history: none.  Medication history: none.    Occupational Profile Information:  Current occupation is IT engeeiner   Currently working in normal job without restrictions  Job Tasks: computer work  Prior functional level:  no limitations  Barriers include:none  Mobility: No difficulty  Transportation: drives  Leisure activities/hobbies: football, gym    Functional Outcome Measure:  See flowsheet     Objective:  Pain Level Report  VAS(0-10) 9/12/2017   At Rest: 1/10   With Use: 7-8/10     Report of Pain:  Location:  hand  Pain Quality:  Numb, Penetrating, Pressure and Throbbing  Frequency: at night    Pain is worst:  nighttime  Exacerbated by:  Position during sleep   Relieved by:  otc medications    Finger Edema  Circumference:  (Measured in cm)  Left hand 9/12/2017      Location: 2 3 4 5   P1 8 8 7 6.5   PIP 7.5 8.5 7 6.5   P2 6.5 7 6.5 5.5   DIP 5 5.5 5 5     Wrist crease 19.9cm     Sensation: Numbess over DIPS 2-5    ROM finger    L Finger flexion 9/12/2017      AROM(PROM)  IF MF RF SF   MCP 45 45 50 45    100 100 90   DIP 50 70 60 70   PARIS         Pt able to extend digits 2-5 to 0.     ROM wrist   Wrist 9/12/2017    AROM (PROM) L    Extension 55     Flexion 75    Supination WFL    Pronation WFL      Strength:  Not tested     Assessment:  Patient presents with symptoms consistent with diagnosis of L metacarpal fractures,  with conservative intervention.     Patient's limitations or Problem List includes:  Pain, Decreased ROM/motion, Increased edema and Weakness of the left hand which interferes with the patient's ability to perform Self Care Tasks (dressing), Work Tasks, Sleep Patterns, Recreational Activities and Household Chores as compared to previous level of function.    Rehab Potential:  Excellent - Return to full activity, no limitations    Patient will benefit from skilled Occupational Therapy to increase ROM and overall strength and decrease pain to return to previous activity level and resume normal daily tasks and to reach their rehab potential.    Barriers to Learning:  No barrier    Communication Issues:  Patient appears to be able to clearly communicate and understand verbal and written communication and follow directions correctly.    Chart Review: Chart Review and Simple history review with patient    Identified Performance Deficits: bathing/showering, dressing, home establishment and management, meal preparation and cleanup, work and leisure activities    Assessment of Occupational Performance:  1-3 Performance Deficits    Clinical Decision Making (Complexity): Low complexity    Treatment Explanation:  The following has been discussed with the patient:  RX ordered/plan of care  Anticipated outcomes  Possible risks and side effects    Plan:  Frequency:  2 X a month, once daily  Duration:  for 10 weeks    Treatment Plan:   Modalities:  US, Fluidotherapy and Paraffin  Therapeutic Exercise:  AROM, AAROM, PROM, Tendon Gliding, Blocking, Reverse Blocking, and Place and Hold, Isotonics and Isometrics when approved by MD  Neuromuscular re-education:  Sensory re-education, Kinesiotaping and Isometrics  Manual Techniques:  Joint mobilization, Myofascial  release and Manual edema mobilization  Orthotic Fabrication:  Static orthosis, dorsal based with MCPs blocked at ~45, IPs free  Discharge Plan:  Achieve all LTG.  Independent in home treatment program.  Reach maximal therapeutic benefit.    Home Exercise Program:  Gentle AROM of wrist  Tendon glides     Next Visit:  Check splint, may need to re-mold due to changes in edema. Attempted to increase MCP flexion  Gentle AROM of fingers and wrist   Measure hand edema figure 8, ROM:UD, RD, fingers

## 2017-09-12 NOTE — MR AVS SNAPSHOT
"              After Visit Summary   9/12/2017    Yanet Castañeda    MRN: [id number]           Patient Information     Date Of Birth          1985        Visit Information        Provider Department      9/12/2017 2:00 PM Brittney Montes OTR [facility]        Today's Diagnoses     Closed fracture of metacarpal bone    -  1    Stiffness of left hand joint        Swelling of left hand           Follow-ups after your visit        Your next 10 appointments already scheduled     Sep 22, 2017  2:30 PM CDT   TANNER Hand with EMMANUEL Garcia   [facility] ([facility])    [address] [place] [id number] [phone]            Oct 03, 2017 12:45 PM CDT   (Arrive by 12:30 PM)   RETURN HAND with Risa Lindo MD   [facility] ([facility])    [address] [place] [id number] [phone]              Who to contact     If you have questions or need follow up information about today's clinic visit or your schedule please contact [facility] directly at [phone].  Normal or non-critical lab and imaging results will be communicated to you by [employer], letter or phone within 4 business days after the clinic has received the results. If you do not hear from us within 7 days, please contact the clinic through [employer] or phone. If you have a critical or abnormal lab result, we will notify you by phone as soon as possible.  Submit refill requests through [employer] or call your pharmacy and they will forward the refill request to us. Please allow 3 business days for your refill to be completed.          Additional Information About Your Visit        MyChart Information     [employer] lets you send messages to your doctor, view your test results, renew your prescriptions, schedule appointments and more. To sign up, go to [email or web address] . Click on \"Log in\" on the left " "side of the screen, which will take you to the Welcome page. Then click on \"Sign up Now\" on the right side of the page.     You will be asked to enter the access code listed below, as well as some personal information. Please follow the directions to create your username and password.     Your access code is: [email or web address]  Expires: 2017 11:26 AM     Your access code will  in 90 days. If you need help or a new code, please call your [facility] clinic or [phone].        Care EveryWhere ID     This is your Care EveryWhere ID. This could be used by other organizations to access your [facility] medical records  [id number]         Blood Pressure from Last 3 Encounters:   17 147/85    Weight from Last 3 Encounters:   17 101.6 kg (224 lb)   17 101.6 kg (224 lb)   17 101.6 kg (224 lb)              We Performed the Following     APPLY SHORT ARM SPLINT STATIC     HC OT EVAL, LOW COMPLEXITY        Primary Care Provider    Physician No Ref-Primary       No address on file        Equal Access to Services     [facility]: Hadii jose godoy Sojohn, waaxda luqyoung, qaybta kaalmafelix chapman, grace velasco . So [facility] [phone].    ATENCIÓN: Si habla español, tiene a neal disposición servicios gratuitos de asistencia lingüística. Dara al [phone].    We comply with applicable federal civil rights laws and [place] laws. We do not discriminate on the basis of race, color, national origin, age, disability sex, sexual orientation or gender identity.            Thank you!     Thank you for choosing [facility]  for your care. Our goal is always to provide you with excellent care. Hearing back from our patients is one way we can continue to improve our services. Please take a few minutes to complete the written survey that you may receive in the mail after your visit with us. Thank you!             Your Updated Medication List - Protect others around " you: Learn how to safely use, store and throw away your medicines at www.disposemymeds.org.          This list is accurate as of: 9/12/17  3:05 PM.  Always use your most recent med list.                   Brand Name Dispense Instructions for use Diagnosis    HYDROcodone-acetaminophen 5-325 MG per tablet    NORCO    30 tablet    Take 1-2 tablets by mouth every 6 hours as needed for pain

## 2017-12-05 NOTE — PROGRESS NOTES
CHIEF COMPLAINT:  Follow-up left index through small finger metacarpal fractures.      DATE OF INJURY:  08/18/2017.      HISTORY OF PRESENT ILLNESS:  Mr. Castañeda is a 32-year-old right-hand dominant male who is now 3-1/2 weeks status post the above injury.  We last saw him 2 weeks ago and placed him into a cast as he elected to forego any surgical intervention.  He is therefore here today for cast removal, follow-up radiographs and a hand therapy appointment.  Overall, he is doing well.  He denies any pain.  His swelling has continued to decrease.  He is not requiring any pain medications.      PHYSICAL EXAMINATION:  BMI 32.1.  Pain is 10.  QuickDASH today is 72.77.  Adult male in no acute distress.  He is alert and oriented x3.   RESPIRATORY:  Breathing is regular and nonlabored.   EXTREMITIES:  Examination of his left upper extremity reveals full shoulder, elbow, forearm, and wrist range of motion.  He continues to have mild swelling of his hand.  He has incomplete flexion of the digits secondary to maintain swelling.  No malrotation, malalignment or scissoring of the digits.  He has intact finger abduction, EPL and FPL.  Sensation is intact to light touch in median, ulnar and radial nerve distributions.  His fingers are warm and well perfused with capillary refill less than 2 seconds.  Skin is intact without any open wounds, rashes or abrasions.      RADIOGRAPHIC IMAGING:  Three views of the left hand were obtained today and independently reviewed.  These again demonstrate the fractures of the index through small finger metacarpal base with nondisplaced long, ring and small fingers.  The index finger metacarpal fracture is unchanged in position.  There is evidence for early healing, although the fracture line remains visible.      ASSESSMENT:  The patient is a 32-year-old right-hand dominant male now 3-1/2 weeks status post left index, long, ring and small metacarpal base fractures with mild displacement of the  index and long, but stable.      PLAN:  Today, we will have him see hand therapy for fabrication of a splint.  He will wear this nearly full-time except when he is working on finger range of motion exercises.  He will return again in 3 weeks for reevaluation with new radiographs of his left hand at that time.  If he has any problems, questions or concerns prior to that, he will contact our clinic.

## 2017-12-07 PROBLEM — S62.309A CLOSED FRACTURE OF METACARPAL BONE: Status: RESOLVED | Noted: 2017-09-12 | Resolved: 2017-12-07

## 2017-12-07 PROBLEM — M25.642 STIFFNESS OF LEFT HAND JOINT: Status: RESOLVED | Noted: 2017-09-12 | Resolved: 2017-12-07

## 2017-12-07 PROBLEM — M79.89 SWELLING OF LEFT HAND: Status: RESOLVED | Noted: 2017-09-12 | Resolved: 2017-12-07

## 2017-12-07 NOTE — PROGRESS NOTES
Discharge Note -Hand Therapy    Initial Evaluation Date:  9/12/17  Current Date: 12/7/2017  Number of Visits: 1    S: Pt canceled f/u hand therapy appointment on 10/3/17    O:  Objective information is not available as pt has not returned for therapy.  Last visit or last objective information will serve as final entry.      A: Pt did not return for further treatment.    Status of goals is unknown due to lack of followup of patient.      P:  Discharge from Hand Therapy.